# Patient Record
Sex: MALE | Race: WHITE | HISPANIC OR LATINO | URBAN - METROPOLITAN AREA
[De-identification: names, ages, dates, MRNs, and addresses within clinical notes are randomized per-mention and may not be internally consistent; named-entity substitution may affect disease eponyms.]

---

## 2017-08-22 ENCOUNTER — EMERGENCY (EMERGENCY)
Facility: HOSPITAL | Age: 22
LOS: 1 days | Discharge: HOME | End: 2017-08-22
Attending: EMERGENCY MEDICINE
Payer: MEDICAID

## 2017-08-22 VITALS
OXYGEN SATURATION: 98 % | SYSTOLIC BLOOD PRESSURE: 129 MMHG | WEIGHT: 139.99 LBS | HEIGHT: 71 IN | RESPIRATION RATE: 20 BRPM | TEMPERATURE: 99 F | DIASTOLIC BLOOD PRESSURE: 84 MMHG | HEART RATE: 106 BPM
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
## 2017-08-22 DIAGNOSIS — S69.92XA UNSPECIFIED INJURY OF LEFT WRIST, HAND AND FINGER(S), INITIAL ENCOUNTER: ICD-10-CM
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
## 2017-08-22 PROCEDURE — 29125 APPL SHORT ARM SPLINT STATIC: CPT

## 2017-08-22 PROCEDURE — 99283 EMERGENCY DEPT VISIT LOW MDM: CPT | Mod: 25

## 2017-08-22 NOTE — ED ADULT TRIAGE NOTE - CHIEF COMPLAINT QUOTE
Pt stated he just returned from [facility] today.  Pt c/o he fell in [facility] can't remember how he was intoxicated, c/o pain ans swelling to left hand.

## 2017-08-23 VITALS
HEART RATE: 98 BPM | DIASTOLIC BLOOD PRESSURE: 86 MMHG | RESPIRATION RATE: 18 BRPM | TEMPERATURE: 99 F | OXYGEN SATURATION: 100 % | SYSTOLIC BLOOD PRESSURE: 113 MMHG

## 2017-08-23 PROCEDURE — 29125 APPL SHORT ARM SPLINT STATIC: CPT | Mod: LT

## 2017-08-23 PROCEDURE — 99283 EMERGENCY DEPT VISIT LOW MDM: CPT | Mod: 25

## 2017-08-23 PROCEDURE — 90471 IMMUNIZATION ADMIN: CPT

## 2017-08-23 PROCEDURE — 90714 TD VACC NO PRESV 7 YRS+ IM: CPT

## 2017-08-23 PROCEDURE — 73130 X-RAY EXAM OF HAND: CPT | Mod: 26,LT

## 2017-08-23 PROCEDURE — 73130 X-RAY EXAM OF HAND: CPT

## 2017-08-23 RX ORDER — BACITRACIN ZINC 500 UNIT/G
1 OINTMENT IN PACKET (EA) TOPICAL
Qty: 1 | Refills: 0
Start: 2017-08-23 | End: 2017-08-30

## 2017-08-23 RX ORDER — OXYCODONE AND ACETAMINOPHEN 5; 325 MG/1; MG/1
1 TABLET ORAL ONCE
Refills: 0 | Status: DISCONTINUED | OUTPATIENT
Start: 2017-08-23 | End: 2017-08-23

## 2017-08-23 RX ORDER — TETANUS AND DIPHTHERIA TOXOIDS ADSORBED 2; 2 [LF]/.5ML; [LF]/.5ML
0.5 INJECTION INTRAMUSCULAR ONCE
Refills: 0 | Status: COMPLETED | OUTPATIENT
Start: 2017-08-23 | End: 2017-08-23

## 2017-08-23 RX ADMIN — TETANUS AND DIPHTHERIA TOXOIDS ADSORBED 0.5 MILLILITER(S): 2; 2 INJECTION INTRAMUSCULAR at 01:32

## 2017-08-23 RX ADMIN — OXYCODONE AND ACETAMINOPHEN 1 TABLET(S): 5; 325 TABLET ORAL at 01:31

## 2017-08-23 RX ADMIN — Medication 500 MILLIGRAM(S): at 04:09

## 2017-08-23 RX ADMIN — Medication 500 MILLIGRAM(S): at 04:08

## 2017-08-23 RX ADMIN — OXYCODONE AND ACETAMINOPHEN 1 TABLET(S): 5; 325 TABLET ORAL at 02:09

## 2017-08-23 RX ADMIN — Medication 1 TABLET(S): at 01:31

## 2017-08-23 NOTE — ED PROVIDER NOTE - SKIN WOUND DESCRIPTION
2 small abrasion and MCP joints of L fourth and fifth digits w/ redness and swelling, no pus draining

## 2017-08-23 NOTE — ED ADULT NURSE REASSESSMENT NOTE - NS ED NURSE REASSESS COMMENT FT1
came to ED due to pain and swelling left hand since yesterday after hitting to wall, noted with dry wound about 0.5 cm x 1.5 cm no drainage.

## 2017-08-23 NOTE — ED PROVIDER NOTE - MUSCULOSKELETAL, MLM
Spine appears normal, range of motion is not limited, no muscle or joint tenderness. neurovascularly intact, full range of motion of all extremities.

## 2017-08-23 NOTE — ED PROVIDER NOTE - OBJECTIVE STATEMENT
20 y/o M pt with no significant PMHx presents to ED c/o injury to L hand after punching a wall yesterday. Pt presents w/ swelling and some redness to MCP joints of L fourth and fifth digit. Pt has 2 small abrasions at same site, denies that it was injured by punching someone in mouth. Denies any pus draining, any deformities, numbness, tingling or any other complaints. NKDA.

## 2017-08-23 NOTE — ED PROVIDER NOTE - MEDICAL DECISION MAKING DETAILS
22 y/o M presenting with injury to L hand. Will give pain meds, abx, take care of wound locally w/ cleaning and Bacitracin. Splinted hand and discharged home w/ ortho f/u. Stressed importance of rigid abx dosing and that these wounds can become infected. Pt is aware to return to ER for any worsening symptoms as this would require IV abx.

## 2017-08-23 NOTE — ED ADULT NURSE NOTE - CHIEF COMPLAINT QUOTE
Pt stated he just returned from [facility] today.  Pt c/o he fell in [facility] can't remember how he was intoxicated, c/o pain ans swelling to left hand.

## 2017-08-31 ENCOUNTER — EMERGENCY (EMERGENCY)
Facility: HOSPITAL | Age: 22
LOS: 1 days | Discharge: HOME | End: 2017-08-31
Attending: EMERGENCY MEDICINE | Admitting: EMERGENCY MEDICINE
Payer: MEDICAID

## 2017-08-31 VITALS
DIASTOLIC BLOOD PRESSURE: 68 MMHG | HEART RATE: 66 BPM | TEMPERATURE: 98 F | SYSTOLIC BLOOD PRESSURE: 105 MMHG | OXYGEN SATURATION: 100 % | RESPIRATION RATE: 18 BRPM

## 2017-08-31 PROCEDURE — 99284 EMERGENCY DEPT VISIT MOD MDM: CPT

## 2017-08-31 NOTE — ED ADULT TRIAGE NOTE - CHIEF COMPLAINT QUOTE
left hand injury    pt states punched a wall on sunday...was seen at [facility]...had xray but was told hand was too swollen and they couldn't tell if was fx'ed .....told to follow up with orthopedist but unable to get an appt....states pain persists....fingers warm and mobile...cap refill < 3 secs. rec'd with splint

## 2017-08-31 NOTE — ED PROVIDER NOTE - ATTENDING CONTRIBUTION TO CARE
I was physically present for the E/M service provided. I agree with above history, physical, and plan which I have reviewed and edited where appropriate. I was physically present for the key portions of the service provided.    21 yr old male with left hand pain after punching a concrete wall seen at [facility] and told no fracture but splint was placed, comes back because unable to get hand follow up and pain persist.    exam shows 2 small cuts at 4th and 5th knuckle (denies punching anyone in mouth) swelling and ttp at 5th metacarpal otehrwise moving finger without difficulty.  no sign of infection or Kanavel's sign  will obtain xr of hand to r/o fracture. if neg place ulnar gutter splint, f/u with hand surgeon and pain control.

## 2017-08-31 NOTE — ED PROVIDER NOTE - CARE PLAN
Instructions for follow-up, activity and diet:	Rest, ice, elevate area.  Take Motrin 600mg every 8 hrs with food for pain.  Follow up with PMD within 48-72 hrs.  Follow up with hand specialist in 1 week - bring your results. Keep the splint dry and clean. Any worsening pain, swelling, weakness, numbness return to ED. Ortho referral list provided if needed. Principal Discharge DX:	Hand pain, left  Instructions for follow-up, activity and diet:	Rest, ice, elevate area.  Take Motrin 600mg every 8 hrs with food for pain.  Follow up with PMD within 48-72 hrs.  Follow up with hand specialist in 1 week - bring your results. Keep the splint dry and clean. Any worsening pain, swelling, weakness, numbness return to ED. Ortho referral list provided if needed.

## 2017-08-31 NOTE — ED PROVIDER NOTE - PLAN OF CARE
Rest, ice, elevate area.  Take Motrin 600mg every 8 hrs with food for pain.  Follow up with PMD within 48-72 hrs.  Follow up with hand specialist in 1 week - bring your results. Keep the splint dry and clean. Any worsening pain, swelling, weakness, numbness return to ED. Ortho referral list provided if needed.

## 2017-08-31 NOTE — ED PROVIDER NOTE - PROGRESS NOTE DETAILS
AVIVA Cabello: Pt informed pt regarding negative hand xray, DC pt with hand follow up and pain control

## 2017-08-31 NOTE — ED PROVIDER NOTE - OBJECTIVE STATEMENT
21M no PMH p/w left hand MCP injury sustained 5 days ago when he punched a cement wall while in the [place]. Pt reports went to [facility] 3 days ago and had Xrays, was told hand was too swollen to tell if anything was fractured, placed in splint and given abx, naproxen and ortho f/u. Pt has f/u appointment in 2 weeks but pain has persisted despite naproxen so came back to ED to see if anything is broken. No further injury. Has 2 small lacerations over painful area that appear to be healing well, no erythema or pus. Denies that these injuries could have occurred with contact with teeth. Able to move hand with pain. Swelling improved.

## 2017-09-01 PROCEDURE — 73130 X-RAY EXAM OF HAND: CPT | Mod: 26,LT

## 2017-09-01 RX ORDER — IBUPROFEN 200 MG
600 TABLET ORAL ONCE
Refills: 0 | Status: COMPLETED | OUTPATIENT
Start: 2017-09-01 | End: 2017-09-01

## 2017-09-01 RX ADMIN — Medication 600 MILLIGRAM(S): at 01:35

## 2017-09-01 NOTE — ED ADULT NURSE NOTE - CHIEF COMPLAINT QUOTE
left hand injury    pt states punched a wall on sunday...was seen at [facility]...had xray but was told hand was too swollen and they couldn't tell if was fx'ed .....told to follow up with orthopedist but unable to get an appt....states pain persists....fingers warm and mobile...cap refill < 3 secs. rec'd with splint

## 2020-01-13 NOTE — ED PROVIDER NOTE - MEDICAL DECISION MAKING DETAILS
Addended by: JOSE G IRENE on: 1/13/2020 03:31 PM     Modules accepted: Orders     21M with clinical suspicion for boxer's fracture. Taking abx. Will repeat Xrays. Has ortho f/u in 2 weeks. 21M with clinical suspicion for left boxer's fracture. Taking abx. Will repeat Xrays. Has ortho f/u in 2 weeks.
